# Patient Record
Sex: MALE | URBAN - METROPOLITAN AREA
[De-identification: names, ages, dates, MRNs, and addresses within clinical notes are randomized per-mention and may not be internally consistent; named-entity substitution may affect disease eponyms.]

---

## 2020-02-17 NOTE — TELEPHONE ENCOUNTER
DIAGNOSIS: Bilateral Foot pain   Left Foot Surgery, [facility], 2017 unclear on exact dates that goes for imaging also.   APPOINTMENT DATE: Mar 24, 2020    NOTES STATUS DETAILS   OFFICE NOTE from referring provider N/A    OFFICE NOTE from other specialist Care Everywhere 10/17/17 Dr. Claros, [place]   DISCHARGE SUMMARY from hospital N/A    DISCHARGE REPORT from the ER N/A    OPERATIVE REPORT Care Everywhere 10/17/17 NM  2/7/17 NM   MEDICATION LIST Care Everywhere    IMPLANT RECORD/STICKER Care Everywhere    LABS     CBC/DIFF N/A    CULTURES N/A    INJECTIONS DONE IN RADIOLOGY N/A    MRI N/A    CT SCAN N/A    XRAYS (IMAGES & REPORTS) Received  NM 2/7/17   TUMOR     PATHOLOGY  Slides & report N/A       02/17/20 SE  2:28 PM  Faxed request to [facility] [place] for images

## 2020-03-18 ENCOUNTER — TELEPHONE (OUTPATIENT)
Dept: ORTHOPEDICS | Facility: CLINIC | Age: 25
End: 2020-03-18

## 2020-03-18 NOTE — TELEPHONE ENCOUNTER
Called patient. No answer. Voicemail left. Informed patient that his appointment scheduled on 3- with DR. Curiel needs to be cancelled and re-scheduled to May 11 or later due to precautions being taken for the COVID-19 virus.     Advised patient to call back and re-schedule.

## 2020-03-23 ENCOUNTER — TELEPHONE (OUTPATIENT)
Dept: ORTHOPEDICS | Facility: CLINIC | Age: 25
End: 2020-03-23

## 2020-03-23 NOTE — TELEPHONE ENCOUNTER
I called the patient this morning and Dr. Galan requests to help get him rescheduled. Due to the COVID-19 virus I informed him that we are only able to see urgent and emergent cases. I gave him our phone number to call us back to get this rescheduled as he will not be seen tomorrow.    Pattie Pearl, ATC

## 2020-03-24 ENCOUNTER — TELEPHONE (OUTPATIENT)
Dept: ORTHOPEDICS | Facility: CLINIC | Age: 25
End: 2020-03-24

## 2020-03-24 ENCOUNTER — PRE VISIT (OUTPATIENT)
Dept: ORTHOPEDICS | Facility: CLINIC | Age: 25
End: 2020-03-24

## 2020-05-03 ENCOUNTER — HOSPITAL ENCOUNTER (EMERGENCY)
Facility: CLINIC | Age: 25
End: 2020-05-03
Payer: COMMERCIAL